# Patient Record
Sex: FEMALE | Race: BLACK OR AFRICAN AMERICAN | NOT HISPANIC OR LATINO | URBAN - METROPOLITAN AREA
[De-identification: names, ages, dates, MRNs, and addresses within clinical notes are randomized per-mention and may not be internally consistent; named-entity substitution may affect disease eponyms.]

---

## 2024-08-09 ENCOUNTER — HOSPITAL ENCOUNTER (OUTPATIENT)
Dept: RADIOLOGY | Facility: HOSPITAL | Age: 59
Discharge: HOME | End: 2024-08-09
Payer: COMMERCIAL

## 2024-08-09 ENCOUNTER — TELEPHONE (OUTPATIENT)
Dept: ORTHOPEDIC SURGERY | Facility: CLINIC | Age: 59
End: 2024-08-09

## 2024-08-09 ENCOUNTER — OFFICE VISIT (OUTPATIENT)
Dept: ORTHOPEDIC SURGERY | Facility: CLINIC | Age: 59
End: 2024-08-09
Payer: COMMERCIAL

## 2024-08-09 ENCOUNTER — HOSPITAL ENCOUNTER (OUTPATIENT)
Dept: RADIOLOGY | Facility: CLINIC | Age: 59
Discharge: HOME | End: 2024-08-09
Payer: COMMERCIAL

## 2024-08-09 VITALS — HEIGHT: 68 IN | WEIGHT: 180 LBS | BODY MASS INDEX: 27.28 KG/M2

## 2024-08-09 DIAGNOSIS — M25.469 SWELLING OF KNEE: Primary | ICD-10-CM

## 2024-08-09 DIAGNOSIS — M79.661 PAIN OF RIGHT CALF: ICD-10-CM

## 2024-08-09 DIAGNOSIS — M25.469 SWELLING OF KNEE: ICD-10-CM

## 2024-08-09 PROCEDURE — 99204 OFFICE O/P NEW MOD 45 MIN: CPT

## 2024-08-09 PROCEDURE — 3008F BODY MASS INDEX DOCD: CPT

## 2024-08-09 PROCEDURE — 73564 X-RAY EXAM KNEE 4 OR MORE: CPT | Mod: RT

## 2024-08-09 PROCEDURE — 93971 EXTREMITY STUDY: CPT

## 2024-08-09 PROCEDURE — 93971 EXTREMITY STUDY: CPT | Performed by: RADIOLOGY

## 2024-08-09 RX ORDER — MELOXICAM 15 MG/1
15 TABLET ORAL DAILY
Qty: 14 TABLET | Refills: 0 | Status: SHIPPED | OUTPATIENT
Start: 2024-08-09 | End: 2024-08-23

## 2024-08-09 NOTE — PROGRESS NOTES
Juan Piedra is a 58 y.o.  year-old  female. she is a new patient to our office and presents with a chief complaint of Right knee pain. Symptoms began 5 days ago.  Pain located in the back of the knee and in the past day or 2 has radiated down into the calf. The pain is worse with activity. They describe the symptoms as pain and swelling. Denies injury, giving way of the knee, locking/catching, night pain, prior knee injury or surgery. Treatment to date has been ice/nsaids/activity modification without adequate relief. She has a family hx of blood clots.       Past Medical, Family, and Social History reviewed and inlcude:[none] all other history pertinent to the presenting problem  Review of Systems  A complete review of systems was conducted, pertinent only to the HPI noted above.  Constitutional: None  Eyes: No additions to above history  Ears, Nose, Throat: No additions to above history  Cardiovascular: No additions to above history  Respiratory: No additions to above history  GI: No additions to above history  : No additions to above history  Skin/Neuro: No additions to above history  Endocrine/Heme/Lymph: No additions to above history  Immunologic: No additions to above history  Psychiatric: No additions to above history  Musculoskeletal: see above  GEN: Alert and Oriented x 3  Constitutional: Well appearing [male], in no apparent distress.  Eyes: sclera anicteric  ENT: hearing appropriate for normal conversation, neck appears symmetric with no gross thyromegaly  Pulm: No labored breathing, no wheezing  CVS: Regular rate and rhythm  Skin: No rashes, erythema, or induration around knee    MUSCULOSKELETAL EXAM:     Right KNEE:  ROM:  [0]/ [0] / 110  Effusion: [none]  Alignment: [neutral]      Sensation intact bilaterally sural/saphenous/sp/dp/tibial nerve bilaterally  Motor 5/5 knee flexion/extension/foot DF/PF/EHL/FHL bilaterally  Palpable/symmetric DP and PT pulse bilaterally      PATELLAR/EXTENSOR  MECHANISM:  KNEE:  Patellar Crepitus: yes  Patellar Compression Pain:yes  Patellar Apprehension: [no]  Extensor Mechanism: [intact]  Straight Leg Raise: fair      LIGAMENTS:  ACL: Lachmans: [negative]  PCL: [stable]  Valgus at 0 degrees: [stable]  Valgus at 30 degrees: [stable]  Varus at 0 degrees: [stable]  Varus at 30 degrees: [stable]    MENISCUS EXAM:  Joint Line Tenderness:medial joint line tenderness  McMurrays: [negative]  Pain with Deep Flexion: [No]    Xrays independently interpreted and reviewed: 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     The patient history, physical examination and imaging studies are consistent with knee arthritis and possible Baker's cyst. As she is now having calf pain with positive family hx of blood clots, recommend a stat VDUS of her right lower extremity, this was ordered. If positive, she will need follow up care for this with her PCP.  The treatment options including NSAIDs, activity modification, PT (including the importance of obtaining full motion), and weight loss were discussed at length today. I have also suggested a potential for IA injection with cortisone. We elected to wait on the injection as we are ruling out a blood clot. If negative, she will call me if she should like to try an injection. Offered to send a short course of Meloxicam in the meantime which she wanted to proceed with, this was sent. I have discussed the potential need for arthroplasty in the future. The patient acknowledged the current plan.

## 2024-08-09 NOTE — TELEPHONE ENCOUNTER
Spoke with patient to discuss RLE ultrasound results. No evidence of DVT, Baker's cyst noted. Discussed NSAIDs versus IA steroid injection. Will call to schedule if she should like to go ahead with the injection.

## 2024-11-01 ENCOUNTER — OFFICE VISIT (OUTPATIENT)
Dept: ORTHOPEDIC SURGERY | Facility: CLINIC | Age: 59
End: 2024-11-01
Payer: COMMERCIAL

## 2024-11-01 DIAGNOSIS — M17.11 ARTHRITIS OF RIGHT KNEE: Primary | ICD-10-CM

## 2024-11-01 PROCEDURE — 99213 OFFICE O/P EST LOW 20 MIN: CPT
